# Patient Record
(demographics unavailable — no encounter records)

---

## 2021-05-23 NOTE — RAD
EXAM:  XR RIGHT CLAVICLE, XR SHOULDER_RIGHT 2+ VIEWS 5/23/2021 10:21 AM



CLINICAL INDICATION:  Right shoulder pain after playing football one day ago



COMPARISON:  None



TECHNIQUE:  3 views of the right shoulder. 2 views of the right clavicle



FINDINGS:  



Right shoulder: No fracture or dislocation. The acromioclavicular and glenohumeral joints are normal.
 The subacromial space is maintained.



Right clavicle: No fracture or dislocation. Acromioclavicular alignment and coracoclavicular interval
 are normal.



IMPRESSION:  Normal right shoulder and clavicle radiographs



Electronically signed by: Amairani Shoemaker MD (5/23/2021 10:26 AM) [id number]

## 2021-05-23 NOTE — RAD
EXAM:  XR RIGHT CLAVICLE, XR SHOULDER_RIGHT 2+ VIEWS 5/23/2021 10:21 AM



CLINICAL INDICATION:  Right shoulder pain after playing football one day ago



COMPARISON:  None



TECHNIQUE:  3 views of the right shoulder. 2 views of the right clavicle



FINDINGS:  



Right shoulder: No fracture or dislocation. The acromioclavicular and glenohumeral joints are normal.
 The subacromial space is maintained.



Right clavicle: No fracture or dislocation. Acromioclavicular alignment and coracoclavicular interval
 are normal.



IMPRESSION:  Normal right shoulder and clavicle radiographs



Electronically signed by: Amairani Shoemaker MD (5/23/2021 10:26 AM) [id number]

## 2021-05-23 NOTE — PHYS DOC
Past Medical History


Past Medical History:  Other


Additional Past Medical Histor:  sickle cell trait


Past Surgical History:  No Surgical History


Smoking Status:  Never Smoker


Alcohol Use:  None





General Adult


EDM:


Chief Complaint:  CLAVICLE INJURY





HPI:


HPI:





Patient is a 28  year old male who presents with right clavicle and shoulder 

pain after he was playing football yesterday and got hit.  He states that he was

fine last night but when he awoke this morning it was hard for him to put his 

shirt on and he has pain.  He has no pain just sitting but with movement it cau

ses him sharp pulling type pain that he rates a 7 out of 10.  He denies any 

numbness or tingling, coolness to the extremity or skin color change.





Review of Systems:


Review of Systems:


Constitutional:   Denies fever or chills. []


Eyes:   Denies change in visual acuity. []


HENT:   Denies nasal congestion or sore throat. [] 


Respiratory:   Denies cough or shortness of breath. [] 


Cardiovascular:   Denies chest pain. + Right shoulder edema. [] 


GI:   Denies abdominal pain, nausea, vomiting, bloody stools or diarrhea. [] 


:  Denies dysuria. [] 


Musculoskeletal:   Denies back pain. + Right shoulder joint pain. + Right 

clavicle pain [] 


Integument:   Denies rash. [] 


Neurologic:   Denies headache, focal weakness or sensory changes. [] 


Endocrine:   Denies polyuria or polydipsia. [] 


Lymphatic:  Denies swollen glands. [] 


Psychiatric:  Denies depression or anxiety. []





Heart Score:


C/O Chest Pain:  No


Risk Factors:


Risk Factors:  DM, Current or recent (<one month) smoker, HTN, HLP, family 

history of CAD, obesity.


Risk Scores:


Score 0 - 3:  2.5% MACE over next 6 weeks - Discharge Home


Score 4 - 6:  20.3% MACE over next 6 weeks - Admit for Clinical Observation


Score 7 - 10:  72.7% MACE over next 6 weeks - Early Invasive Strategies





Physical Exam:


PE:





Constitutional: Well developed, well nourished, no acute distress, non-toxic 

appearance. []


HENT: Normocephalic, atraumatic, bilateral external ears normal, oropharynx 

moist, no oral exudates, nose normal. []


Eyes: PERRLA, EOMI, conjunctiva normal, no discharge. [] 


Neck: Normal range of motion, no tenderness, supple, no stridor. [] 


Cardiovascular:Heart rate regular rhythm, no murmur []


Lungs & Thorax:  Bilateral breath sounds clear to auscultation []


Abdomen: Bowel sounds normal, soft, no tenderness, no masses, no pulsatile 

masses. [] 


Skin: Warm, dry, no erythema, no rash. [] 


Back: No tenderness, no CVA tenderness. [] 


Extremities: No tenderness, no cyanosis, no clubbing, right shoulder ROM intact 

but limited due to pain, right clavicle and anterior shoulder 2+ edema. [] 


Neurologic: Alert and oriented X 3, normal motor function, normal sensory 

function, no focal deficits noted. []


Psychologic: Affect normal, judgement normal, mood normal. []





Current Patient Data:


Vital Signs:





                                   Vital Signs








  Date Time  Temp Pulse Resp B/P (MAP) Pulse Ox O2 Delivery O2 Flow Rate FiO2


 


21 09:45 98.5 76 16 123/64 (83) 98 Room Air  





 98.5       











EKG:


EKG:


[]





Radiology/Procedures:


Radiology/Procedures:


[]


Impression:


                            [facility]


                    [address] [place] [address]


                                 [phone]


                                        


                                 IMAGING REPORT





                                     Signed





PATIENT: JOSE ANTONIO MATOS  ACCOUNT: [id number]     MRN#: [id number]


: 1993           LOCATION: ER              AGE: 28


SEX: M                    EXAM DT: 21         ACCESSION#: [id number]


STATUS: PRE ER            ORD. PHYSICIAN: RYLEY HARRIS


REASON: right shoulder pain after playing football x1 day ago


PROCEDURE: CLAVICLE RIGHT





EXAM:  XR RIGHT CLAVICLE, XR SHOULDER_RIGHT 2+ VIEWS 2021 10:21 AM





CLINICAL INDICATION:  Right shoulder pain after playing football one day ago





COMPARISON:  None





TECHNIQUE:  3 views of the right shoulder. 2 views of the right clavicle





FINDINGS:  





Right shoulder: No fracture or dislocation. The acromioclavicular and 

glenohumeral joints are normal. The subacromial space is maintained.





Right clavicle: No fracture or dislocation. Acromioclavicular alignment and 

coracoclavicular interval are normal.





IMPRESSION:  Normal right shoulder and clavicle radiographs





Electronically signed by: Amairani Shoemaker MD (2021 10:26 AM) [id number]














DICTATED and SIGNED BY:     AMAIRANI SHOEMAKER MD


DATE:     21 [id number]





Course & Med Decision Making:


Course & Med Decision Making


Pertinent Labs and Imaging studies reviewed. (See chart for details)





See HPI.  Ambulatory with a steady gait.  Speaks in full clear sentences.  

Radial pulses strong and present.  Skin pink warm and dry.  Can wiggle his 

fingers.  He does have range of motion in the right shoulder but it is limited. 

 There is no tenderness elicited over the clavicle or shoulder.  There is no 

deformity.  There is about 2+ swelling in the shoulder and clavicle area.  

Sensations are intact.  X-ray of clavicle and shoulder show no acute findings.





[]





Dragon Disclaimer:


Dragon Disclaimer:


This electronic medical record was generated, in whole or in part, using a voice

 recognition dictation system.





Departure


Departure


Impression:  


   Primary Impression:  


   Shoulder pain, right


   Qualified Codes:  M25.511 - Pain in right shoulder


   Additional Impression:  


   Clavicle pain


Disposition:   HOME / SELF CARE / HOMELESS


Condition:  STABLE


Referrals:  


NO PCP (PCP)








FLAKITO HERNANDEZ MD


Patient Instructions:  Shoulder Pain





Additional Instructions:  


Follow-up with primary care provider or an orthopedic doctor.  Use ice or heat 

to help with your pain.  No heavy lifting for the next couple weeks.  Take 

ibuprofen to help with your pain.


Scripts


Cyclobenzaprine Hcl (CYCLOBENZAPRINE HCL) 5 Mg Tablet


1 TAB PO TID, #30 TAB


   Prov: RYLEY HARRIS APRN         21 


Ibuprofen (IBUPROFEN) 600 Mg Tablet


600 MG PO PRN Q6HRS PRN for INFLAMMATION, #25 TAB


   Prov: RYLEY HARRIS APRN         21











RYLEY HARRIS APRN            May 23, 2021 10:39